# Patient Record
Sex: FEMALE | Race: WHITE | Employment: OTHER | URBAN - METROPOLITAN AREA
[De-identification: names, ages, dates, MRNs, and addresses within clinical notes are randomized per-mention and may not be internally consistent; named-entity substitution may affect disease eponyms.]

---

## 2024-04-02 ENCOUNTER — OFFICE VISIT (OUTPATIENT)
Dept: NEUROSURGERY | Age: 59
End: 2024-04-02
Payer: MEDICARE

## 2024-04-02 VITALS
HEIGHT: 65 IN | SYSTOLIC BLOOD PRESSURE: 140 MMHG | TEMPERATURE: 98.2 F | OXYGEN SATURATION: 96 % | DIASTOLIC BLOOD PRESSURE: 71 MMHG | HEART RATE: 79 BPM

## 2024-04-02 DIAGNOSIS — R20.2 BILATERAL ARM NUMBNESS AND TINGLING WHILE SLEEPING: Primary | ICD-10-CM

## 2024-04-02 DIAGNOSIS — R20.0 BILATERAL ARM NUMBNESS AND TINGLING WHILE SLEEPING: Primary | ICD-10-CM

## 2024-04-02 DIAGNOSIS — M48.02 CERVICAL STENOSIS OF SPINAL CANAL: ICD-10-CM

## 2024-04-02 PROCEDURE — G8427 DOCREV CUR MEDS BY ELIG CLIN: HCPCS | Performed by: NEUROLOGICAL SURGERY

## 2024-04-02 PROCEDURE — 3078F DIAST BP <80 MM HG: CPT | Performed by: NEUROLOGICAL SURGERY

## 2024-04-02 PROCEDURE — 99204 OFFICE O/P NEW MOD 45 MIN: CPT | Performed by: NEUROLOGICAL SURGERY

## 2024-04-02 PROCEDURE — 1036F TOBACCO NON-USER: CPT | Performed by: NEUROLOGICAL SURGERY

## 2024-04-02 PROCEDURE — 3017F COLORECTAL CA SCREEN DOC REV: CPT | Performed by: NEUROLOGICAL SURGERY

## 2024-04-02 PROCEDURE — 3077F SYST BP >= 140 MM HG: CPT | Performed by: NEUROLOGICAL SURGERY

## 2024-04-02 PROCEDURE — G8421 BMI NOT CALCULATED: HCPCS | Performed by: NEUROLOGICAL SURGERY

## 2024-04-02 RX ORDER — ASPIRIN 81 MG/1
81 TABLET ORAL
COMMUNITY
Start: 2023-05-26

## 2024-04-02 RX ORDER — CYCLOBENZAPRINE HCL 5 MG
5 TABLET ORAL 3 TIMES DAILY PRN
COMMUNITY
Start: 2023-07-19

## 2024-04-02 RX ORDER — BUPROPION HYDROCHLORIDE 150 MG/1
TABLET ORAL
COMMUNITY

## 2024-04-02 RX ORDER — CEFADROXIL 500 MG/1
500 CAPSULE ORAL 2 TIMES DAILY
COMMUNITY
Start: 2023-06-01

## 2024-04-02 RX ORDER — LORAZEPAM 2 MG/1
TABLET ORAL
COMMUNITY

## 2024-04-02 RX ORDER — ACETAMINOPHEN 500 MG
1000 TABLET ORAL
COMMUNITY
Start: 2022-07-14

## 2024-04-02 RX ORDER — CELECOXIB 200 MG/1
200 CAPSULE ORAL 2 TIMES DAILY
COMMUNITY

## 2024-04-02 RX ORDER — DULOXETIN HYDROCHLORIDE 60 MG/1
60 CAPSULE, DELAYED RELEASE ORAL 2 TIMES DAILY
COMMUNITY
Start: 2015-09-10

## 2024-04-02 RX ORDER — DIPHENHYDRAMINE HCL 25 MG
50 CAPSULE ORAL EVERY 6 HOURS PRN
COMMUNITY

## 2024-04-02 RX ORDER — GABAPENTIN 300 MG/1
CAPSULE ORAL
COMMUNITY
Start: 2023-05-31

## 2024-04-02 RX ORDER — ONDANSETRON 4 MG/1
TABLET, ORALLY DISINTEGRATING ORAL
COMMUNITY

## 2024-04-02 RX ORDER — PRAMIPEXOLE DIHYDROCHLORIDE 1.5 MG/1
1.5 TABLET ORAL 2 TIMES DAILY
COMMUNITY
Start: 2024-03-19 | End: 2025-03-19

## 2024-04-02 RX ORDER — FLUCONAZOLE 150 MG/1
TABLET ORAL
COMMUNITY
Start: 2024-01-22

## 2024-04-02 RX ORDER — LOSARTAN POTASSIUM 50 MG/1
TABLET ORAL
COMMUNITY
Start: 2016-01-11

## 2024-04-02 RX ORDER — LEVOTHYROXINE SODIUM 0.05 MG/1
TABLET ORAL
COMMUNITY

## 2024-04-02 RX ORDER — IRON POLYSACCHARIDE COMPLEX 150 MG
150 CAPSULE ORAL
COMMUNITY
Start: 2022-07-28

## 2024-04-02 RX ORDER — LAMOTRIGINE 200 MG/1
200 TABLET ORAL 2 TIMES DAILY
COMMUNITY

## 2024-04-02 NOTE — PROGRESS NOTES
History of Present Illness    Patient Words: 58 y.o.     This patient is a 58 y.o. female who presents today for neurosurgical evaluation.  She is a very pleasant but unfortunate 58-year-old lady with cervical stenosis and myelopathy with chin on chest.  She has bilateral upper extremity numbness of 3 months duration.  She cannot feel her hands.  She is in a wheelchair chronically from previous back surgery which required 85 days in the hospital at an outside institution.  No recent studies.    No past medical history on file.     Allergies   Allergen Reactions    Penicillins Hives, Swelling, Rash, Shortness Of Breath and Other (See Comments)     Other reaction(s): Hives/Swelling-Allergy, Hives/Swelling-Allergy, Wheezing and/or shortness of breathe-Allergy   given cefazolin (2016, 2020, 2021), cephalexin (2016), ceftriaxone (2016), and cefepime (2021)   given cefazolin (2016, 2020, 2021), cephalexin (2016), ceftriaxone (2016), and cefepime (2021)     given cefazolin (2016, 2020, 2021), cephalexin (2016), ceftriaxone (2016), and cefepime (2021)    Acetazolamide Rash     Diamox    Ketamine Anxiety, Hallucinations and Other (See Comments)     Emotional Outbursts, Hallucinations, Shaking, pulled IV out -    Codeine Nausea Only and Itching    Other     Oxycodone-Acetaminophen     Adhesive Tape Hives     PICC line, tegaderm caused blisters    PICC tegaderm when left on for an extended period of time caused skin irritation; no problems short term.   Tolerates tegaderm in [facility] peripheral IV kits    Erythromycin Nausea And Vomiting     Other reaction(s): GI intolerance, Vomiting    Sulfa Antibiotics Nausea And Vomiting and Other (See Comments)     Other reaction(s): GI intolerance, Nausea and/or vomiting-Intolerance, Unknown (comments)        No family history on file.     Social History     Socioeconomic History    Marital status:      Spouse name: Not on file    Number of children: Not on file    Years of

## 2024-04-17 DIAGNOSIS — R20.2 BILATERAL ARM NUMBNESS AND TINGLING WHILE SLEEPING: Primary | ICD-10-CM

## 2024-04-17 DIAGNOSIS — R20.0 BILATERAL ARM NUMBNESS AND TINGLING WHILE SLEEPING: Primary | ICD-10-CM

## 2024-04-17 DIAGNOSIS — M48.02 CERVICAL STENOSIS OF SPINAL CANAL: ICD-10-CM
